# Patient Record
Sex: FEMALE | URBAN - METROPOLITAN AREA
[De-identification: names, ages, dates, MRNs, and addresses within clinical notes are randomized per-mention and may not be internally consistent; named-entity substitution may affect disease eponyms.]

---

## 2022-11-10 ENCOUNTER — OFFICE VISIT (OUTPATIENT)
Dept: FAMILY MEDICINE CLINIC | Age: 32
End: 2022-11-10
Payer: MEDICAID

## 2022-11-10 VITALS
OXYGEN SATURATION: 98 % | WEIGHT: 170 LBS | SYSTOLIC BLOOD PRESSURE: 119 MMHG | BODY MASS INDEX: 27.32 KG/M2 | HEIGHT: 66 IN | DIASTOLIC BLOOD PRESSURE: 81 MMHG | RESPIRATION RATE: 17 BRPM | TEMPERATURE: 98 F | HEART RATE: 72 BPM

## 2022-11-10 DIAGNOSIS — K21.00 GASTROESOPHAGEAL REFLUX DISEASE WITH ESOPHAGITIS, UNSPECIFIED WHETHER HEMORRHAGE: Primary | ICD-10-CM

## 2022-11-10 DIAGNOSIS — R13.19 ESOPHAGEAL DYSPHAGIA: ICD-10-CM

## 2022-11-10 PROCEDURE — 99203 OFFICE O/P NEW LOW 30 MIN: CPT | Performed by: STUDENT IN AN ORGANIZED HEALTH CARE EDUCATION/TRAINING PROGRAM

## 2022-11-10 RX ORDER — DIPHENHYDRAMINE HCL 25 MG
25 CAPSULE ORAL
COMMUNITY

## 2022-11-10 RX ORDER — OMEPRAZOLE 20 MG/1
20 CAPSULE, DELAYED RELEASE ORAL DAILY
COMMUNITY

## 2022-11-10 RX ORDER — FAMOTIDINE 20 MG/1
20 TABLET, FILM COATED ORAL 2 TIMES DAILY
COMMUNITY

## 2022-11-10 NOTE — PROGRESS NOTES
Name and  Verified. Previous PCP: No Previous PCP    Pharmacy verified       Chief Complaint   Patient presents with    New Patient    [address] from [place]    1. Have you been to the ER, urgent care clinic since your last visit? Hospitalized since your last visit? No    2. Have you seen or consulted any other health care providers outside of the [address] [place] [address] since your last visit? Include any pap smears or colon screening.      Yes  [facility] Ayesha Zambrano  3/2021  Normal (Repeat in 3 years)      Health Maintenance Due   Topic Date Due    Hepatitis C Screening  Never done    Depression Screen  Never done    COVID-19 Vaccine (1) 1992    DTaP/Tdap/Td series (1 - Tdap) Never done    Cervical cancer screen  Never done    Flu Vaccine (1) Never done

## 2022-11-10 NOTE — PATIENT INSTRUCTIONS
GASTROENTEROLOGY:      Michael Huggins, Dr. Jerry Bergman  [phone]      Gauri Wilburn, Dr. Raudel Douglas or Dr. Deepthi Br[address]e [address], [place], [address]     [phone].

## 2022-11-10 NOTE — PROGRESS NOTES
[address] [address] AIMEE Chew. Jason, [address] [place] [address]  [phone]    C/C: GERD    HPI:    Zena Meyer is a 29 y.o. DECLINED  female who presents to clinic today for evaluation of the issues listed above. Pt is new to the practice. Previous pcp: Moved here from [place]  in 2021    Subjective; Patient presenting for initial office visit with me today. GERD, chronic:  States that she has had reflux for several years, started while she was around the age of 8 yo per pt. Treated with sulcrafate the past.  Has been on Omeprazole and pepcid for several years. Has been working out with a  for about a year. Sometimes struggle with dysphagia. Pt denies any  fever, chill, chest pain, SOB, abdominal pain, n/v/d, HA or dizziness. 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 Other Specialists/providers:  [facility]     Allergies- reviewed:    Allergies   Allergen Reactions    Kiwi Anaphylaxis    Pineapple Anaphylaxis    Papaya Rash       Past Medical History- reviewed:  Past Medical History:   Diagnosis Date    Anxiety     Depression        Family History - reviewed:  Family History   Problem Relation Age of Onset    Diabetes Mother     Heart Disease Mother     Stroke Mother     Diabetes Father     Heart Disease Father     Leukemia Father     Cancer Paternal Aunt         Breast       Social History - reviewed:  Social History     Socioeconomic History    Marital status: Not on file     Spouse name: Not on file    Number of children: Not on file    Years of education: Not on file    Highest education level: Not on file   Occupational History    Not on file   Tobacco Use    Smoking status: Never     Passive exposure: Past    Smokeless tobacco: Never   Vaping Use    Vaping Use: Never used   Substance and Sexual Activity    Alcohol use: Yes     Comment: Rarely    Drug use: Never    Sexual activity: Yes     Partners: Male     Birth control/protection: Condom   Other Topics Concern    Not on file   Social History Narrative    Not on file     Social Determinants of Health     Financial Resource Strain: Not on file   Food Insecurity: Not on file   Transportation Needs: Not on file   Physical Activity: Not on file   Stress: Not on file   Social Connections: Not on file   Intimate Partner Violence: Not on file   Housing Stability: Not on file       Depression screening:  3 most recent PHQ Screens 11/10/2022   Little interest or pleasure in doing things Not at all   Feeling down, depressed, irritable, or hopeless Not at all   Total Score PHQ 2 0   Trouble falling or staying asleep, or sleeping too much Not at all   Feeling tired or having little energy Not at all   Poor appetite, weight loss, or overeating Not at all   Feeling bad about yourself - or that you are a failure or have let yourself or your family down Not at all   Trouble concentrating on things such as school, work, reading, or watching TV Not at all   Moving or speaking so slowly that other people could have noticed; or the opposite being so fidgety that others notice Not at all   Thoughts of being better off dead, or hurting yourself in some way Not at all   PHQ 9 Score 0   How difficult have these problems made it for you to do your work, take care of your home and get along with others Not difficult at all         Review of systems:     A comprehensive review of systems was negative except for that written in the History of Present Illness.        Visit Vitals  /81 (BP 1 Location: Right arm, BP Patient Position: Sitting, BP Cuff Size: Adult)   Pulse 72   Temp 98 °F (36.7 °C) (Oral)   Resp 17   Ht 5' 6\" (1.676 m)   Wt 170 lb (77.1 kg)   LMP 10/27/2022   SpO2 98%   BMI 27.44 kg/m² General: Alert and oriented, in no acute distress. Well nourished. EYE: PERRL. Sclera and conjuctival clear. Extraocular movements intact. EARS: External normal, canals clear, tympanic membranes normal.   NOSE: Mucosa healthy without drainage or ulceration. OROPHARYNX: No suspicious lesions, normal dentition, pharynx, tongue and tonsils normal.  NECK: Supple; no masses; thyroid normal.  LUNGS: Respirations unlabored; clear to auscultation bilaterally. CARDIOVASCULAR: Regular, rate, and rhythm without murmurs, gallops or rubs. ABDOMEN: Soft; nontender; nondistended; normoactive bowel sounds; no masses or organomegaly. MUSCULOSKELETAL: FROM in all extremities     EXT: No edema. Neurovascularlly intact. Normal gait. SKIN: No rash. No suspicious lesions or moles. Neuro: Mental Status: Pt is alert and oriented to person, place, and time. Assessment/Plan       ICD-10-CM ICD-9-CM    1. Gastroesophageal reflux disease with esophagitis, unspecified whether hemorrhage  K21.00 530.11 diphenhydrAMINE (BenadryL) 25 mg capsule      famotidine (Pepcid) 20 mg tablet      omeprazole (PRILOSEC) 20 mg capsule      REFERRAL TO GASTROENTEROLOGY      2. Esophageal dysphagia  R13.19 787.29 REFERRAL TO GASTROENTEROLOGY          1. Gastroesophageal reflux disease with esophagitis, unspecified whether hemorrhage  Will refer to GI given duration  - diphenhydrAMINE (BenadryL) 25 mg capsule; Take 25 mg by mouth every four (4) hours as needed. - famotidine (Pepcid) 20 mg tablet; Take 20 mg by mouth two (2) times a day. - omeprazole (PRILOSEC) 20 mg capsule; Take 20 mg by mouth daily.  - REFERRAL TO GASTROENTEROLOGY    2. Esophageal dysphagia  - REFERRAL TO GASTROENTEROLOGY        Follow up: as needed    I have discussed the diagnosis with the patient and the intended plan as seen in the above orders. The patient has received an after-visit summary and questions were answered concerning future plans.   I have discussed medication side effects and warnings with the patient as well. Informed patient to return to the office if new symptoms arise.     Signed By: Emanuel Barrow MD     November 10, 2022

## 2025-06-19 ENCOUNTER — OFFICE VISIT (OUTPATIENT)
Age: 35
End: 2025-06-19
Payer: MEDICAID

## 2025-06-19 VITALS — HEIGHT: 66 IN | BODY MASS INDEX: 28.12 KG/M2 | WEIGHT: 175 LBS

## 2025-06-19 DIAGNOSIS — Z91.89 AT HIGH RISK FOR BREAST CANCER: ICD-10-CM

## 2025-06-19 DIAGNOSIS — Z12.31 ENCOUNTER FOR SCREENING MAMMOGRAM FOR BREAST CANCER: ICD-10-CM

## 2025-06-19 DIAGNOSIS — Z15.01 BRCA2 GENE MUTATION POSITIVE: ICD-10-CM

## 2025-06-19 DIAGNOSIS — Z80.3 FAMILY HISTORY OF BREAST CANCER: Primary | ICD-10-CM

## 2025-06-19 DIAGNOSIS — Z15.09 BRCA2 GENE MUTATION POSITIVE: ICD-10-CM

## 2025-06-19 PROCEDURE — 99203 OFFICE O/P NEW LOW 30 MIN: CPT | Performed by: NURSE PRACTITIONER

## 2025-06-19 NOTE — PROGRESS NOTES
HISTORY OF PRESENT ILLNESS  Dilcia Michel is a 35 y.o. adult     HPI New patient presents for evaluation as a high risk patient due to a newly diagnosed BRCA2 mutation. Denies breast mass, skin changes, nipple discharge and pain.        Family history -   Paternal aunt - breast cancer at 42  2025 - patient had [employer] genetic testing at [facility] - BRCA2 mutation  Patient's father is  and did not have genetic testing.        OB History    No obstetric history on file.      Obstetric Comments   Menarche 13, LMP 27, # of children 0, age of 1st delivery n/a, Hysterectomy/oophorectomy no/no, Breast bx no, history of breast feeding no, BCP yes, Hormone therapy no                 Review of Systems      Physical Exam       PE deferred - no breast concerns      Ht 1.676 m (5' 6\")   Wt 79.4 kg (175 lb)   BMI 28.25 kg/m²       ASSESSMENT and PLAN   Diagnosis Orders   1. Family history of breast cancer        2. At high risk for breast cancer  MRI BREAST BILATERAL W WO CONTRAST      3. Encounter for screening mammogram for breast cancer  VIRAJ NO DIGITAL SCREEN BILATERAL      4. BRCA2 gene mutation positive            PE deferred - no breast concerns    High risk patient due to BRCA2 mutation - estimated lifetime risk of breast cancer 38-84%.    We discussed being followed as a high risk patient with yearly mammograms, yearly breast MRIs and an annual CBE here.    Discussed types of breast imaging - mammogram, ultrasound and MRI.  Discussed breast MRI procedure, use of contrast and higher rate of false positives.    Will plan to have BSMammogram 3D and breast MRI.      We reviewed risk reduction through lifestyle changes including an overall healthy diet, regular exercise and keeping alcohol consumption to a minimum.    We also discussed risk reduction through the use of tamoxifen - will not pursue at this time.  Discussed recommendation for prophylactic surgery.  Is considering this.  Discussed briefly

## 2025-06-23 ENCOUNTER — TELEPHONE (OUTPATIENT)
Age: 35
End: 2025-06-23

## 2025-06-23 NOTE — TELEPHONE ENCOUNTER
Patient left message thru the portal to schedule MRI and screening mammogram. Left patient a message w/central scheduling and advised her that the orders are already in the system. She should have no problem of scheduling them both.

## 2025-06-25 ENCOUNTER — CLINICAL DOCUMENTATION (OUTPATIENT)
Age: 35
End: 2025-06-25

## 2025-06-25 ENCOUNTER — TELEPHONE (OUTPATIENT)
Age: 35
End: 2025-06-25

## 2025-06-25 NOTE — PROGRESS NOTES
Pt message in about having trouble with scheduling imaging appt. I called [facility] and spoke with doe about this matter. She will be giving the pt a call to get the matter resolved. Also messaged pt to inform her to be expecting a call form imaging .

## 2025-06-25 NOTE — TELEPHONE ENCOUNTER
Appointment Request From: Dilcia Michel     With Provider: Dr. Donaldo Perdomo MD [[facility] MAIN OFF[facility]]     Preferred Date Range: 7/14/2025 - 7/25/2025     Preferred Times: Any Time     Reason for visit: Request an Appointment     Health Maintenance Topic:      Comments:  General health appointment/Check up.     Message was left for patient to contact office  to confirm appointment on 9/17/25 @ 9:40 am

## 2025-07-07 ENCOUNTER — HOSPITAL ENCOUNTER (OUTPATIENT)
Facility: HOSPITAL | Age: 35
Discharge: HOME | End: 2025-07-10
Payer: MEDICAID

## 2025-07-07 VITALS — BODY MASS INDEX: 28 KG/M2 | WEIGHT: 174.2 LBS | HEIGHT: 66 IN

## 2025-07-07 DIAGNOSIS — Z12.31 ENCOUNTER FOR SCREENING MAMMOGRAM FOR BREAST CANCER: ICD-10-CM

## 2025-07-07 PROCEDURE — 77063 BREAST TOMOSYNTHESIS BI: CPT

## 2025-07-14 ENCOUNTER — TRANSCRIBE ORDERS (OUTPATIENT)
Facility: HOSPITAL | Age: 35
End: 2025-07-14

## 2025-07-14 DIAGNOSIS — Z15.09 GENETIC SUSCEPTIBILITY TO OTHER MALIGNANT NEOPLASM: Primary | ICD-10-CM

## 2025-07-23 ENCOUNTER — HOSPITAL ENCOUNTER (OUTPATIENT)
Facility: HOSPITAL | Age: 35
Discharge: HOME | End: 2025-07-26
Payer: MEDICAID

## 2025-07-23 DIAGNOSIS — Z91.89 AT HIGH RISK FOR BREAST CANCER: ICD-10-CM

## 2025-07-23 PROCEDURE — A9585 GADOBUTROL INJECTION: HCPCS | Performed by: NURSE PRACTITIONER

## 2025-07-23 PROCEDURE — 6360000004 HC RX CONTRAST MEDICATION: Performed by: NURSE PRACTITIONER

## 2025-07-23 PROCEDURE — C8908 MRI W/O FOL W/CONT, BREAST,: HCPCS

## 2025-07-23 RX ORDER — GADOBUTROL 604.72 MG/ML
8 INJECTION INTRAVENOUS
Status: COMPLETED | OUTPATIENT
Start: 2025-07-23 | End: 2025-07-23

## 2025-07-23 RX ADMIN — GADOBUTROL 8 ML: 604.72 INJECTION INTRAVENOUS at 08:37
